# Patient Record
Sex: FEMALE | URBAN - METROPOLITAN AREA
[De-identification: names, ages, dates, MRNs, and addresses within clinical notes are randomized per-mention and may not be internally consistent; named-entity substitution may affect disease eponyms.]

---

## 2017-04-21 ENCOUNTER — APPOINTMENT (OUTPATIENT)
Dept: URBAN - METROPOLITAN AREA CLINIC 319 | Age: 67
Setting detail: DERMATOLOGY
End: 2017-04-21

## 2017-04-21 DIAGNOSIS — D22 MELANOCYTIC NEVI: ICD-10-CM

## 2017-04-21 DIAGNOSIS — L81.4 OTHER MELANIN HYPERPIGMENTATION: ICD-10-CM

## 2017-04-21 DIAGNOSIS — L57.0 ACTINIC KERATOSIS: ICD-10-CM

## 2017-04-21 PROBLEM — D22.39 MELANOCYTIC NEVI OF OTHER PARTS OF FACE: Status: ACTIVE | Noted: 2017-04-21

## 2017-04-21 PROCEDURE — OTHER TREATMENT REGIMEN: OTHER

## 2017-04-21 PROCEDURE — OTHER COUNSELING: OTHER

## 2017-04-21 PROCEDURE — OTHER OTHER: OTHER

## 2017-04-21 PROCEDURE — OTHER REASSURANCE: OTHER

## 2017-04-21 PROCEDURE — 99214 OFFICE O/P EST MOD 30 MIN: CPT

## 2017-04-21 ASSESSMENT — LOCATION SIMPLE DESCRIPTION DERM
LOCATION SIMPLE: LEFT FOREHEAD
LOCATION SIMPLE: RIGHT FOREARM
LOCATION SIMPLE: NOSE
LOCATION SIMPLE: LEFT FOREHEAD

## 2017-04-21 ASSESSMENT — LOCATION DETAILED DESCRIPTION DERM
LOCATION DETAILED: LEFT INFERIOR FOREHEAD
LOCATION DETAILED: RIGHT PROXIMAL RADIAL DORSAL FOREARM
LOCATION DETAILED: LEFT FOREHEAD
LOCATION DETAILED: NASAL SUPRATIP

## 2017-04-21 ASSESSMENT — LOCATION ZONE DERM
LOCATION ZONE: FACE
LOCATION ZONE: NOSE
LOCATION ZONE: ARM
LOCATION ZONE: FACE

## 2017-04-21 NOTE — PROCEDURE: OTHER
Other (Free Text): Will consider excision in the future
Note Text (......Xxx Chief Complaint.): This diagnosis correlates with the
Detail Level: Detailed

## 2017-04-21 NOTE — PROCEDURE: TREATMENT REGIMEN
Detail Level: Detailed
Otc Regimen: Sunscreen apply daily throughout body
Plan: Pt may consider Triluma in the fall

## 2017-08-09 ENCOUNTER — APPOINTMENT (OUTPATIENT)
Dept: URBAN - METROPOLITAN AREA CLINIC 319 | Age: 67
Setting detail: DERMATOLOGY
End: 2017-08-09

## 2017-08-09 DIAGNOSIS — L73.8 OTHER SPECIFIED FOLLICULAR DISORDERS: ICD-10-CM

## 2017-08-09 DIAGNOSIS — Z87.2 PERSONAL HISTORY OF DISEASES OF THE SKIN AND SUBCUTANEOUS TISSUE: ICD-10-CM

## 2017-08-09 DIAGNOSIS — L82.0 INFLAMED SEBORRHEIC KERATOSIS: ICD-10-CM

## 2017-08-09 PROCEDURE — 17110 DESTRUCT B9 LESION 1-14: CPT

## 2017-08-09 PROCEDURE — OTHER REASSURANCE: OTHER

## 2017-08-09 PROCEDURE — 99214 OFFICE O/P EST MOD 30 MIN: CPT | Mod: 25

## 2017-08-09 PROCEDURE — OTHER LIQUID NITROGEN: OTHER

## 2017-08-09 PROCEDURE — OTHER COUNSELING: OTHER

## 2017-08-09 ASSESSMENT — LOCATION SIMPLE DESCRIPTION DERM
LOCATION SIMPLE: LEFT LIP
LOCATION SIMPLE: RIGHT FOREARM
LOCATION SIMPLE: NOSE

## 2017-08-09 ASSESSMENT — LOCATION DETAILED DESCRIPTION DERM
LOCATION DETAILED: RIGHT PROXIMAL DORSAL FOREARM
LOCATION DETAILED: NASAL TIP
LOCATION DETAILED: LEFT NASOLABIAL FOLD
LOCATION DETAILED: RIGHT PROXIMAL RADIAL DORSAL FOREARM

## 2017-08-09 ASSESSMENT — LOCATION ZONE DERM
LOCATION ZONE: LIP
LOCATION ZONE: ARM
LOCATION ZONE: NOSE

## 2017-08-09 NOTE — PROCEDURE: LIQUID NITROGEN
Medical Necessity Clause: Itchy and painful and inflamed
Add 52 Modifier (Optional): no
Medical Necessity Information: It is in your best interest to select a reason for this procedure from the list below. All of these items fulfill various CMS LCD requirements except the new and changing color options.
Include Z78.9 (Other Specified Conditions Influencing Health Status) As An Associated Diagnosis?: Yes
Number Of Freeze-Thaw Cycles: 1 freeze-thaw cycle
Detail Level: Detailed
Post-Care Instructions: Apply Vaseline to treated area(s) three times a day to help soothe pain from freezing

## 2018-01-19 ENCOUNTER — RX ONLY (RX ONLY)
Age: 68
End: 2018-01-19

## 2018-01-19 RX ORDER — AZELAIC ACID 0.2 G/G
CREAM CUTANEOUS
Qty: 1 | Refills: 4 | Status: ERX

## 2018-08-09 ENCOUNTER — APPOINTMENT (OUTPATIENT)
Dept: URBAN - METROPOLITAN AREA CLINIC 319 | Age: 68
Setting detail: DERMATOLOGY
End: 2018-08-09

## 2018-08-09 DIAGNOSIS — L72.0 EPIDERMAL CYST: ICD-10-CM

## 2018-08-09 DIAGNOSIS — L82.1 OTHER SEBORRHEIC KERATOSIS: ICD-10-CM

## 2018-08-09 PROCEDURE — 99213 OFFICE O/P EST LOW 20 MIN: CPT

## 2018-08-09 PROCEDURE — OTHER COUNSELING: OTHER

## 2018-08-09 PROCEDURE — OTHER DEFER: OTHER

## 2018-08-09 PROCEDURE — OTHER TREATMENT REGIMEN: OTHER

## 2018-08-09 PROCEDURE — OTHER REASSURANCE: OTHER

## 2018-08-09 ASSESSMENT — LOCATION SIMPLE DESCRIPTION DERM
LOCATION SIMPLE: LEFT CALF
LOCATION SIMPLE: LEFT HAND
LOCATION SIMPLE: RIGHT EYE
LOCATION SIMPLE: LEFT FOREARM
LOCATION SIMPLE: RIGHT HAND

## 2018-08-09 ASSESSMENT — LOCATION ZONE DERM
LOCATION ZONE: HAND
LOCATION ZONE: EYELID
LOCATION ZONE: LEG
LOCATION ZONE: ARM

## 2018-08-09 ASSESSMENT — LOCATION DETAILED DESCRIPTION DERM
LOCATION DETAILED: LEFT PROXIMAL CALF
LOCATION DETAILED: RIGHT RADIAL DORSAL HAND
LOCATION DETAILED: LEFT DISTAL DORSAL FOREARM
LOCATION DETAILED: RIGHT CARUNCULA
LOCATION DETAILED: LEFT RADIAL DORSAL HAND

## 2019-06-03 ENCOUNTER — RX ONLY (RX ONLY)
Age: 69
End: 2019-06-03

## 2019-06-03 RX ORDER — AZELAIC ACID 0.2 G/G
CREAM CUTANEOUS
Qty: 1 | Refills: 4 | COMMUNITY
Start: 2019-06-03

## 2019-06-17 ENCOUNTER — RX ONLY (RX ONLY)
Age: 69
End: 2019-06-17

## 2019-06-17 RX ORDER — AZELAIC ACID 0.2 G/G
CREAM CUTANEOUS
Qty: 1 | Refills: 4 | Status: ERX

## 2019-08-15 ENCOUNTER — APPOINTMENT (OUTPATIENT)
Dept: URBAN - METROPOLITAN AREA CLINIC 319 | Age: 69
Setting detail: DERMATOLOGY
End: 2019-08-15

## 2019-08-15 DIAGNOSIS — L73.8 OTHER SPECIFIED FOLLICULAR DISORDERS: ICD-10-CM

## 2019-08-15 PROCEDURE — 99213 OFFICE O/P EST LOW 20 MIN: CPT

## 2019-08-15 PROCEDURE — OTHER COUNSELING: OTHER

## 2019-08-15 PROCEDURE — OTHER TREATMENT REGIMEN: OTHER

## 2019-08-15 PROCEDURE — OTHER OTHER: OTHER

## 2019-08-15 ASSESSMENT — LOCATION SIMPLE DESCRIPTION DERM
LOCATION SIMPLE: RIGHT FOREHEAD
LOCATION SIMPLE: RIGHT FOREHEAD

## 2019-08-15 ASSESSMENT — LOCATION ZONE DERM
LOCATION ZONE: FACE
LOCATION ZONE: FACE

## 2019-08-15 ASSESSMENT — LOCATION DETAILED DESCRIPTION DERM
LOCATION DETAILED: RIGHT INFERIOR FOREHEAD
LOCATION DETAILED: RIGHT INFERIOR FOREHEAD

## 2019-08-15 NOTE — PROCEDURE: OTHER
Note Text (......Xxx Chief Complaint.): This diagnosis correlates with the
Other (Free Text): Electro x 1 right forehead
Detail Level: Detailed

## 2020-06-26 ENCOUNTER — RX ONLY (RX ONLY)
Age: 70
End: 2020-06-26

## 2020-06-26 RX ORDER — AZELAIC ACID 0.2 G/G
CREAM CUTANEOUS
Qty: 1 | Refills: 4 | Status: ERX

## 2020-08-13 ENCOUNTER — RX ONLY (RX ONLY)
Age: 70
End: 2020-08-13

## 2020-08-13 ENCOUNTER — APPOINTMENT (OUTPATIENT)
Dept: URBAN - METROPOLITAN AREA CLINIC 319 | Age: 70
Setting detail: DERMATOLOGY
End: 2020-08-13

## 2020-08-13 DIAGNOSIS — L70.0 ACNE VULGARIS: ICD-10-CM

## 2020-08-13 DIAGNOSIS — L82.1 OTHER SEBORRHEIC KERATOSIS: ICD-10-CM

## 2020-08-13 PROCEDURE — 99214 OFFICE O/P EST MOD 30 MIN: CPT

## 2020-08-13 PROCEDURE — OTHER PRESCRIPTION: OTHER

## 2020-08-13 PROCEDURE — OTHER COUNSELING: OTHER

## 2020-08-13 PROCEDURE — OTHER REASSURANCE: OTHER

## 2020-08-13 PROCEDURE — OTHER TREATMENT REGIMEN: OTHER

## 2020-08-13 RX ORDER — CLINDAMYCIN PHOSPHATE AND BENZOYL PEROXIDE 10; 50 MG/G; MG/G
GEL TOPICAL
Qty: 1 | Refills: 1 | Status: ERX | COMMUNITY
Start: 2020-08-13

## 2020-08-13 RX ORDER — CLINDAMYCIN PHOSPHATE AND BENZOYL PEROXIDE 10; 50 MG/G; MG/G
GEL TOPICAL
Qty: 1 | Refills: 1 | Status: ERX

## 2020-08-13 ASSESSMENT — LOCATION DETAILED DESCRIPTION DERM
LOCATION DETAILED: RIGHT INFERIOR CENTRAL MALAR CHEEK
LOCATION DETAILED: RIGHT LATERAL MALAR CHEEK
LOCATION DETAILED: LEFT INFERIOR CENTRAL MALAR CHEEK

## 2020-08-13 ASSESSMENT — LOCATION SIMPLE DESCRIPTION DERM
LOCATION SIMPLE: LEFT CHEEK
LOCATION SIMPLE: RIGHT CHEEK

## 2020-08-13 ASSESSMENT — LOCATION ZONE DERM: LOCATION ZONE: FACE

## 2020-08-13 NOTE — PROCEDURE: COUNSELING
Erythromycin Counseling:  I discussed with the patient the risks of erythromycin including but not limited to GI upset, allergic reaction, drug rash, diarrhea, increase in liver enzymes, and yeast infections.
Birth Control Pills Pregnancy And Lactation Text: This medication should be avoided if pregnant and for the first 30 days post-partum.
Sarecycline Pregnancy And Lactation Text: This medication is Pregnancy Category D and not consider safe during pregnancy. It is also excreted in breast milk.
Azithromycin Counseling:  I discussed with the patient the risks of azithromycin including but not limited to GI upset, allergic reaction, drug rash, diarrhea, and yeast infections.
Isotretinoin Pregnancy And Lactation Text: This medication is Pregnancy Category X and is considered extremely dangerous during pregnancy. It is unknown if it is excreted in breast milk.
Topical Sulfur Applications Pregnancy And Lactation Text: This medication is Pregnancy Category C and has an unknown safety profile during pregnancy. It is unknown if this topical medication is excreted in breast milk.
Use Enhanced Medication Counseling?: No
Spironolactone Pregnancy And Lactation Text: This medication can cause feminization of the male fetus and should be avoided during pregnancy. The active metabolite is also found in breast milk.
Bactrim Counseling:  I discussed with the patient the risks of sulfa antibiotics including but not limited to GI upset, allergic reaction, drug rash, diarrhea, dizziness, photosensitivity, and yeast infections.  Rarely, more serious reactions can occur including but not limited to aplastic anemia, agranulocytosis, methemoglobinemia, blood dyscrasias, liver or kidney failure, lung infiltrates or desquamative/blistering drug rashes.
Topical Retinoid Pregnancy And Lactation Text: This medication is Pregnancy Category C. It is unknown if this medication is excreted in breast milk.
Doxycycline Pregnancy And Lactation Text: This medication is Pregnancy Category D and not consider safe during pregnancy. It is also excreted in breast milk but is considered safe for shorter treatment courses.
Topical Clindamycin Counseling: Patient counseled that this medication may cause skin irritation or allergic reactions.  In the event of skin irritation, the patient was advised to reduce the amount of the drug applied or use it less frequently.   The patient verbalized understanding of the proper use and possible adverse effects of clindamycin.  All of the patient's questions and concerns were addressed.
High Dose Vitamin A Pregnancy And Lactation Text: High dose vitamin A therapy is contraindicated during pregnancy and breast feeding.
Tazorac Counseling:  Patient advised that medication is irritating and drying.  Patient may need to apply sparingly and wash off after an hour before eventually leaving it on overnight.  The patient verbalized understanding of the proper use and possible adverse effects of tazorac.  All of the patient's questions and concerns were addressed.
Dapsone Counseling: I discussed with the patient the risks of dapsone including but not limited to hemolytic anemia, agranulocytosis, rashes, methemoglobinemia, kidney failure, peripheral neuropathy, headaches, GI upset, and liver toxicity.  Patients who start dapsone require monitoring including baseline LFTs and weekly CBCs for the first month, then every month thereafter.  The patient verbalized understanding of the proper use and possible adverse effects of dapsone.  All of the patient's questions and concerns were addressed.
Tetracycline Counseling: Patient counseled regarding possible photosensitivity and increased risk for sunburn.  Patient instructed to avoid sunlight, if possible.  When exposed to sunlight, patients should wear protective clothing, sunglasses, and sunscreen.  The patient was instructed to call the office immediately if the following severe adverse effects occur:  hearing changes, easy bruising/bleeding, severe headache, or vision changes.  The patient verbalized understanding of the proper use and possible adverse effects of tetracycline.  All of the patient's questions and concerns were addressed. Patient understands to avoid pregnancy while on therapy due to potential birth defects.
Isotretinoin Counseling: Patient should get monthly blood tests, not donate blood, not drive at night if vision affected, not share medication, and not undergo elective surgery for 6 months after tx completed. Side effects reviewed, pt to contact office should one occur.
Sarecycline Counseling: Patient advised regarding possible photosensitivity and discoloration of the teeth, skin, lips, tongue and gums.  Patient instructed to avoid sunlight, if possible.  When exposed to sunlight, patients should wear protective clothing, sunglasses, and sunscreen.  The patient was instructed to call the office immediately if the following severe adverse effects occur:  hearing changes, easy bruising/bleeding, severe headache, or vision changes.  The patient verbalized understanding of the proper use and possible adverse effects of sarecycline.  All of the patient's questions and concerns were addressed.
High Dose Vitamin A Counseling: Side effects reviewed, pt to contact office should one occur.
Topical Clindamycin Pregnancy And Lactation Text: This medication is Pregnancy Category B and is considered safe during pregnancy. It is unknown if it is excreted in breast milk.
Erythromycin Pregnancy And Lactation Text: This medication is Pregnancy Category B and is considered safe during pregnancy. It is also excreted in breast milk.
Detail Level: Detailed
Benzoyl Peroxide Pregnancy And Lactation Text: This medication is Pregnancy Category C. It is unknown if benzoyl peroxide is excreted in breast milk.
Dapsone Pregnancy And Lactation Text: This medication is Pregnancy Category C and is not considered safe during pregnancy or breast feeding.
Birth Control Pills Counseling: Birth Control Pill Counseling: I discussed with the patient the potential side effects of OCPs including but not limited to increased risk of stroke, heart attack, thrombophlebitis, deep venous thrombosis, hepatic adenomas, breast changes, GI upset, headaches, and depression.  The patient verbalized understanding of the proper use and possible adverse effects of OCPs. All of the patient's questions and concerns were addressed.
Bactrim Pregnancy And Lactation Text: This medication is Pregnancy Category D and is known to cause fetal risk.  It is also excreted in breast milk.
Doxycycline Counseling:  Patient counseled regarding possible photosensitivity and increased risk for sunburn.  Patient instructed to avoid sunlight, if possible.  When exposed to sunlight, patients should wear protective clothing, sunglasses, and sunscreen.  The patient was instructed to call the office immediately if the following severe adverse effects occur:  hearing changes, easy bruising/bleeding, severe headache, or vision changes.  The patient verbalized understanding of the proper use and possible adverse effects of doxycycline.  All of the patient's questions and concerns were addressed.
Tazorac Pregnancy And Lactation Text: This medication is not safe during pregnancy. It is unknown if this medication is excreted in breast milk.
Topical Sulfur Applications Counseling: Topical Sulfur Counseling: Patient counseled that this medication may cause skin irritation or allergic reactions.  In the event of skin irritation, the patient was advised to reduce the amount of the drug applied or use it less frequently.   The patient verbalized understanding of the proper use and possible adverse effects of topical sulfur application.  All of the patient's questions and concerns were addressed.
Azithromycin Pregnancy And Lactation Text: This medication is considered safe during pregnancy and is also secreted in breast milk.
Spironolactone Counseling: Patient advised regarding risks of diarrhea, abdominal pain, hyperkalemia, birth defects (for female patients), liver toxicity and renal toxicity. The patient may need blood work to monitor liver and kidney function and potassium levels while on therapy. The patient verbalized understanding of the proper use and possible adverse effects of spironolactone.  All of the patient's questions and concerns were addressed.
Topical Retinoid counseling:  Patient advised to apply a pea-sized amount only at bedtime and wait 30 minutes after washing their face before applying.  If too drying, patient may add a non-comedogenic moisturizer. The patient verbalized understanding of the proper use and possible adverse effects of retinoids.  All of the patient's questions and concerns were addressed.
Detail Level: Zone
Benzoyl Peroxide Counseling: Patient counseled that medicine may cause skin irritation and bleach clothing.  In the event of skin irritation, the patient was advised to reduce the amount of the drug applied or use it less frequently.   The patient verbalized understanding of the proper use and possible adverse effects of benzoyl peroxide.  All of the patient's questions and concerns were addressed.
Minocycline Counseling: Patient advised regarding possible photosensitivity and discoloration of the teeth, skin, lips, tongue and gums.  Patient instructed to avoid sunlight, if possible.  When exposed to sunlight, patients should wear protective clothing, sunglasses, and sunscreen.  The patient was instructed to call the office immediately if the following severe adverse effects occur:  hearing changes, easy bruising/bleeding, severe headache, or vision changes.  The patient verbalized understanding of the proper use and possible adverse effects of minocycline.  All of the patient's questions and concerns were addressed.

## 2023-05-31 NOTE — PROCEDURE: TREATMENT REGIMEN
Sleep Consultation   Clyde Correia 78 y o  female MRN: [id number]      Reason for consultation: RICH    Requesting physician: Dr Jerome Villavicencio    Assessment/Plan  78 y o  F with PMHx of Hyperlipidemia, GERD, allergic rhinitis, Pulmonary HTN, R MCA bifurcation cerebral thrombus, L renal artery stenosis, chronic atrial fibrillation, B12 deficiency who comes in for evaluation of snoring and excessive daytime sleepiness  1   Severe sleep apnea, majority obstructive - previously with RICH failed CPAP due to tolerance  -  Check an in lab titration to assess for obstructive sleep apnea  Would like to ensure central apnea does not worsen on PAP therapy  -  I discussed in depth the diagnostic studies and treatment options involved with obstructive sleep apnea      -  I also discussed in depth the risk of leaving sleep apnea untreated including hypertension, heart failure, arrhythmia, MI and stroke  -  The patient is agreeable to undergo testing and treatment of obstructive sleep apnea  She understands that pitfalls she may encounter along the way and is willing to attempt CPAP treatment  2   Periodic breathings on sleep study noted - no clear Cheyne ball and no evidence of systolic heart failure on echo  Will move forward with CPAP  If central apnea continues to be a problem, we will need to consider ASV  3   Pulmonary hypertension and Afib - we discussed the association of this with sleep apnea  She will do her best to be consistent with PAP therapy  History of Present Illness   HPI:  Clyde Correia is a 78 y o  female with PMHx as below who comes in for management of apnea  She was diagnosed with RICH about 12 years ago and tried CPAP  She was seen by Dr Vicki Boyd and managed with CPAP  She struggled with tolerance and stopped using it  However, due to cardiac issues including afib, pulmonary hypertension, she was encouraged to start therapy again    Patient notes difficulty falling
asleep, snoring, witnessed apnea and awakening with dry mouth  She denies excessive daytime sleepiness with an [place] score of 4, awakenings with gasping, or morning headaches  she denies symptoms of restless legs  she denies symptoms of cataplexy, sleep paralysis, hypnopompic or hypnagogic hallucinations  Sleep History:  she goes to bed at approximately 11, will get to sleep in 30 min, will get out of bed at 5 am   she will lay in bed for 1-2 hrs and then get up at 8 am   She also gets up 2 times at night for unknown reason  She does not typically nap during the day   ROS:   Review of Systems   Constitutional: Positive for fatigue  Negative for appetite change and unexpected weight change  HENT: Negative  Eyes: Negative  Respiratory: Positive for apnea  Negative for shortness of breath and wheezing  Gastrointestinal: Negative  Endocrine: Negative  Genitourinary: Negative  Musculoskeletal: Negative  Skin: Negative  Allergic/Immunologic: Negative  Neurological: Negative  Hematological: Negative  Historical Information   Past Medical History:   Diagnosis Date   [facility]    • Anemia    • Arthritis    • Breast pain, right    • Chest pain on breathing    • Colon polyp    • Cough    • Diverticulosis    • Encounter for screening colonoscopy    • H/O sick sinus syndrome    • Heart murmur    • High cholesterol    • History of atrial fibrillation     Rate ontrolled  On xarelto 15mg (creatinine 50) Followed by Dr Geovanni Gutierrez with Cardiology    • History of weight loss     Report loose fitting clothes  Weight stable (3lbs difference)  Last colo showed small tubular adenoma x2  Breast biopsy last showed fibrocystic changes  TSH wnl  Will check cbc, bmp, spep         • Hx of long term use of blood thinners    • Hypertension    • Irregular heart beat    • RICH (obstructive sleep apnea)    • Pacemaker    • Preop examination    • Shortness of breath    • Viral bronchitis
Past Surgical History:   Procedure Laterality Date   • BREAST BIOPSY Right 08/17/2006    ultrasound guided Percutaneous Needle Core -Benign   • CATARACT EXTRACTION     • CATARACT EXTRACTION W/ INTRAOCULAR LENS  IMPLANT, BILATERAL     • COLONOSCOPY     • COLONOSCOPY N/A 5/22/2018    Procedure: COLONOSCOPY;  Surgeon: Paris Wilson DO;  [facility]; Service: Gastroenterology   • Gerry Ferguson / Yesi Jyothier / Morris Evelyn     • LEG SURGERY Right     as a child after a fall   • MAMMO STEREOTACTIC BREAST BIOPSY RIGHT (ALL INC) Right 10/2014    benign   • IL CMBND ANTERPOST COLPORRAPHY W/CYSTO N/A 3/17/2016    Procedure: COLPORRHAPHY ANTERIOR POSTERIOR ;  Surgeon: Kristy Lezama MD;  [facility];  Service: Gynecology   • IL COLONOSCOPY FLX DX W/COLLJ SPEC WHEN PFRMD N/A 4/4/2019    Procedure: COLONOSCOPY;  Surgeon: Paris Wilson DO;  [facility]; Service: Gastroenterology   • IL COLPOPEXY VAGINAL EXTRAPERITONEAL APPROACH N/A 3/17/2016    Procedure: COLPOPEXY VAGINAL EXTRAPERITONEAL (VEC) ANTERIOR ;  Surgeon: Kristy Lezama MD;  [facility];  Service: Gynecology   • IL CYSTOURETHROSCOPY N/A 3/17/2016    Procedure: CYSTOSCOPY;  Surgeon: Kristy Lezama MD;  [facility];  Service: Gynecology   • IL ESOPHAGOGASTRODUODENOSCOPY TRANSORAL DIAGNOSTIC N/A 4/16/2018    Procedure: EGD AND COLONOSCOPY;  Surgeon: Paris Wilson DO;  [facility];   Service: Gastroenterology   • IL LAPAROSCOPY COLECTOMY PARTIAL W/ANASTOMOSIS Right 1/13/2022    Procedure: Diagnostic laparoscopy, laparscopic right colectomy;  Surgeon: Manjula Flowers MD;  [facility];  Service: Colorectal   • IL SLING OPERATION STRESS INCONTINENCE N/A 3/17/2016    Procedure: INSERTION PUBOVAGINAL SLING SINGLE INCISION ;  Surgeon: Krsity Lezama MD;  [facility];  Service: Gynecology     Family History   Problem Relation Age of Onset   • Diabetes Mother    • Breast
cancer Sister 48   • No Known Problems Father    • No Known Problems Maternal Grandmother    • No Known Problems Maternal Grandfather    • No Known Problems Paternal Grandmother    • No Known Problems Paternal Grandfather    • No Known Problems Daughter    • No Known Problems Son    • No Known Problems Sister    • No Known Problems Sister    • No Known Problems Brother    • No Known Problems Brother    • No Known Problems Sister    • No Known Problems Paternal Aunt    • No Known Problems Paternal Aunt    • No Known Problems Paternal Aunt    • No Known Problems Paternal Aunt      Social History     Socioeconomic History   • Marital status:      Spouse name: Not on file   • Number of children: Not on file   • Years of education: Not on file   • Highest education level: Not on file   Occupational History   • Occupation: retired   Tobacco Use   • Smoking status: Never   • Smokeless tobacco: Never   Vaping Use   • Vaping Use: Never used   Substance and Sexual Activity   • Alcohol use: Never   • Drug use: No   • Sexual activity: Not Currently     Comment:    Other Topics Concern   • Not on file   Social History Narrative    Housing, household, and economic circumstances      Social Determinants of Health     Financial Resource Strain: Low Risk  (5/10/2023)    Overall Financial Resource Strain (CARDIA)    • Difficulty of Paying Living Expenses: Not hard at all   Food Insecurity: No Food Insecurity (5/10/2023)    Hunger Vital Sign    • Worried About Running Out of Food in the Last Year: Never true    • Ran Out of Food in the Last Year: Never true   Transportation Needs: No Transportation Needs (5/10/2023)    PRAPARE - Transportation    • Lack of Transportation (Medical): No    • Lack of Transportation (Non-Medical):  No   Physical Activity: Inactive (4/14/2021)    Exercise Vital Sign    • Days of Exercise per Week: 0 days    • Minutes of Exercise per Session: 0 min   Stress: No Stress Concern Present
(4/14/2021)    [address]    • Feeling of Stress : Not at all   Social Connections: Moderately Isolated (4/14/2021)    Social Connection and Isolation Panel [NHANES]    • Frequency of Communication with Friends and Family: More than three times a week    • Frequency of Social Gatherings with Friends and Family: More than three times a week    • Attends Restorationist Services: More than 4 times per year    • Active Member of Clubs or Organizations: No    • Attends Club or Organization Meetings: Never    • Marital Status:    Intimate Partner Violence: Not At Risk (4/14/2021)    Humiliation, Afraid, Rape, and Kick questionnaire    • Fear of Current or Ex-Partner: No    • Emotionally Abused: No    • Physically Abused: No    • Sexually Abused: No   Housing Stability: Unknown (8/13/2022)    Housing Stability Vital Sign    • Unable to Pay for Housing in the Last Year: No    • Number of Places Lived in the Last Year: Not on file    • Unstable Housing in the Last Year: No       Occupational History: Formerly worked in [employer]    Meds/Allergies   Allergies   Allergen Reactions   • Other Itching     Bandaids   • Tape [Medical Tape] Itching       Home medications:  Prior to Admission medications    Medication Sig Start Date End Date Taking?  Authorizing Provider   acetaminophen (TYLENOL) 325 mg tablet Take 2 tablets (650 mg total) by mouth every 6 (six) hours as needed for mild pain 9/6/22  Yes Jamaal Ramirez MD   ammonium lactate (LAC-HYDRIN) 12 % cream Apply topically as needed for dry skin 5/10/23  Yes Frank Kelley DPM   Blood Pressure Monitoring (Blood Pressure Cuff) MISC Use every other day For HTN 4/28/21  Yes Antonio Gomes PA-C   furosemide (LASIX) 20 mg tablet Take 2 tablets (40 mg total) by mouth daily 11/23/22  Yes Tiffany Andre MD   Incontinence Supply Disposable (RA Adult Wipes) MISC Use 4 (four) times a day 10/7/22  Yes Tiffany Andre
"MD   losartan (COZAAR) 50 mg tablet TAKE 1 TABLET (50 MG TOTAL) BY MOUTH DAILY 1/11/23  Yes Daylin Alegria MD   melatonin 3 mg Take 1 tablet (3 mg total) by mouth every evening 5/10/23  Yes Barbra Dunn MD   menthol-methyl salicylate (BENGAY) 75-54 % cream Apply topically 2 (two) times a day 9/7/22  Yes Jacklyn Hoskins MD   nebivolol (BYSTOLIC) 5 mg tablet Take 1 tablet (5 mg total) by mouth daily 10/10/22  Yes Daylin Alegria MD   nitroglycerin (NITROSTAT) 0 4 mg SL tablet Place 1 tablet (0 4 mg total) under the tongue every 5 (five) minutes as needed for chest pain 9/6/22  Yes Jacklyn Hoskins MD   pantoprazole (PROTONIX) 40 mg tablet TAKE 1 TABLET (40 MG TOTAL) BY MOUTH DAILY IN THE EARLY MORNING 1/11/23  Yes Daylin Alegria MD   Pradaxa 150 MG capsu TAKE 1 CAPSULE (150 MG TOTAL) BY MOUTH 2 (TWO) TIMES A DAY 2/9/23  Yes Daylin Alegria MD   rosuvastatin (CRESTOR) 10 MG tablet TAKE 0 5 TABLETS (5 MG TOTAL) BY MOUTH DAILY 1/11/23  Yes Daylin Alegria MD   verapamil (CALAN-SR) 240 mg CR tablet Take 1 tablet (240 mg total) by mouth daily at bedtime 10/10/22  Yes aDylin Alegria MD   cyanocobalamin 1,000 mcg/mL Inject 1 mL (1,000 mcg total) into a muscle every 30 (thirty) days  Patient not taking: Reported on 5/31/2023 3/13/23 6/11/23  Daylin Alegria MD   polyethylene glycol (MIRALAX) 17 g packet Take 17 g by mouth daily as needed (First line and refer to bowel protocol)  Patient not taking: Reported on 5/31/2023 9/6/22   Jacklyn Hoskins MD   potassium chloride (K-DUR,KLOR-CON) 10 mEq tablet Take 2 tablets (20 mEq total) by mouth daily  Patient not taking: Reported on 2/15/2023 10/28/22   Valery Boswell MD   apixaban (Eliquis) 5 mg Take 1 tablet (5 mg total) by mouth 2 (two) times a day 8/14/22 8/18/22  Ja Harp DO       Vitals:   Blood pressure 143/71, pulse 89, height 5' 3\" (1 6 m), weight 65 8 kg (145 lb)  , RA, Body mass index is 25 69 kg/m²    Neck Circumference: 15    Physical "
Exam  General: Pleasant, Awake alert and oriented x 3, conversant without conversational dyspnea, NAD, normal affect  HEENT:  PERRL, Sclera noninjected, nonicteric OU, Nares patent,  no craniofacial abnormalities, Mucous membranes, moist, no oral lesions, normal dentition, Mallampati class 4  NECK: Trachea midline, no accessory muscle use, no stridor, no cervical or supraclavicular adenopathy, JVP not elevated  CARDIAC: Reg, single s1/S2, no m/r/g  PULM: CTA bilaterally no wheezing, rhonchi or rales  ABD: Normoactive bowel sounds, soft nontender, nondistended, no rebound, no rigidity, no guarding  EXT: No cyanosis, no clubbing, no edema, normal capillary refill  NEURO: no focal neurologic deficits, AAOx3, moving all extremities appropriately    Labs: I have personally reviewed pertinent lab results  Lab Results   Component Value Date    HCT 32 4 (L) 09/12/2022    HGB 10 4 (L) 09/12/2022    MCV 94 09/12/2022     09/12/2022    WBC 6 57 09/12/2022      Lab Results   Component Value Date    BUN 18 09/12/2022    CALCIUM 8 7 09/12/2022     (H) 09/12/2022    CO2 25 09/12/2022    CREATININE 0 92 09/12/2022    GLUCOSE 88 11/25/2015    K 3 9 09/12/2022     11/25/2015     Lab Results   Component Value Date    FERRITIN 108 01/16/2019    IRON 89 01/16/2019    TIBC 288 01/16/2019     Lab Results   Component Value Date    BSCLQDCX66 251 05/25/2022     Sleep studies:  Diagnostic 12 years ago at [facility] for RICH     Diagnostic:  IMPRESSION:   1  Severe sleep apnea  It should be noted that the events were essentially all hypopneas, moderate snoring was noted  Cheyne-Harrison breathing was not present although a periodic breathing pattern was noted at times during the record  In the absence of central or obstructive apneas, it is hard to fully determine whether this represents obstructive or central disease but more likely obstructive apnea is favored  2  Normal baseline oxygen saturation    Mild to moderate
desaturations present with respiratory events  3  Sleep efficiency was low  Increased Stage N1 sleep (light sleep) was present    Stage N3 sleep (deep sleep) was decreased  REM sleep percentage was decreased on this test  Severe sleep fragmentation was present  4  No evidence of periodic limb movements during sleep  5  EKG shows a paced rhythm, p waves not present in EKG  As a full EKG is not used on this test, only limited assessment is available  6  A full EEG was used on this test   Epileptiform activity was not present  Abnormal behaviors were not present  RECOMMENDATION:  A CPAP titration study is recommended due to sleep apnea and hypoxemia  Sleep medicine consultation also recommended  PFT  Interpretation:  • Normal spirometry  • No significant improvement in airflow or forced vital capacity in response to the administration to bronchodilator per ATS standards  • Normal lung volumes  • Mild decrease in diffusion capacity  • Normal flow-volume loop                                     CXR -   IMPRESSION:  No acute cardiopulmonary disease      DO Jazz [employer] 71 Sleep Physician
Detail Level: Detailed
Discontinue Regimen: Azelex too expensive